# Patient Record
Sex: MALE | Race: WHITE | Employment: FULL TIME | URBAN - METROPOLITAN AREA
[De-identification: names, ages, dates, MRNs, and addresses within clinical notes are randomized per-mention and may not be internally consistent; named-entity substitution may affect disease eponyms.]

---

## 2019-08-28 ENCOUNTER — HOSPITAL ENCOUNTER (EMERGENCY)
Age: 54
Discharge: HOME | End: 2019-08-28
Attending: EMERGENCY MEDICINE
Payer: COMMERCIAL

## 2019-08-28 VITALS
TEMPERATURE: 98.6 F | RESPIRATION RATE: 16 BRPM | HEIGHT: 60 IN | BODY MASS INDEX: 51.04 KG/M2 | HEART RATE: 71 BPM | SYSTOLIC BLOOD PRESSURE: 103 MMHG | OXYGEN SATURATION: 98 % | WEIGHT: 260 LBS | DIASTOLIC BLOOD PRESSURE: 72 MMHG

## 2019-08-28 DIAGNOSIS — S82.032A CLOSED DISPLACED TRANSVERSE FRACTURE OF LEFT PATELLA, INITIAL ENCOUNTER: Primary | ICD-10-CM

## 2019-08-28 PROCEDURE — 99283 EMERGENCY DEPT VISIT LOW MDM: CPT

## 2019-08-28 PROCEDURE — 6360000002 HC RX W HCPCS: Performed by: STUDENT IN AN ORGANIZED HEALTH CARE EDUCATION/TRAINING PROGRAM

## 2019-08-28 PROCEDURE — 90471 IMMUNIZATION ADMIN: CPT | Performed by: STUDENT IN AN ORGANIZED HEALTH CARE EDUCATION/TRAINING PROGRAM

## 2019-08-28 PROCEDURE — 90715 TDAP VACCINE 7 YRS/> IM: CPT | Performed by: STUDENT IN AN ORGANIZED HEALTH CARE EDUCATION/TRAINING PROGRAM

## 2019-08-28 RX ORDER — ASPIRIN 81 MG/1
81 TABLET ORAL DAILY
Qty: 30 TABLET | Refills: 0 | Status: SHIPPED | OUTPATIENT
Start: 2019-08-28

## 2019-08-28 RX ORDER — IBUPROFEN 600 MG/1
600 TABLET ORAL EVERY 6 HOURS PRN
Qty: 120 TABLET | Refills: 0 | Status: SHIPPED | OUTPATIENT
Start: 2019-08-28

## 2019-08-28 RX ORDER — SERTRALINE HYDROCHLORIDE 100 MG/1
100 TABLET, FILM COATED ORAL DAILY
COMMUNITY

## 2019-08-28 RX ORDER — ACETAMINOPHEN 325 MG/1
325 TABLET ORAL EVERY 6 HOURS PRN
Qty: 120 TABLET | Refills: 3 | Status: SHIPPED | OUTPATIENT
Start: 2019-08-28

## 2019-08-28 RX ORDER — ATORVASTATIN CALCIUM 10 MG/1
10 TABLET, FILM COATED ORAL DAILY
COMMUNITY

## 2019-08-28 RX ORDER — OXYCODONE HYDROCHLORIDE 5 MG/1
5 TABLET ORAL EVERY 6 HOURS PRN
Qty: 10 TABLET | Refills: 0 | Status: SHIPPED | OUTPATIENT
Start: 2019-08-28 | End: 2019-08-31

## 2019-08-28 RX ADMIN — TETANUS TOXOID, REDUCED DIPHTHERIA TOXOID AND ACELLULAR PERTUSSIS VACCINE, ADSORBED 0.5 ML: 5; 2.5; 8; 8; 2.5 SUSPENSION INTRAMUSCULAR at 21:15

## 2019-08-28 ASSESSMENT — PAIN SCALES - GENERAL: PAINLEVEL_OUTOF10: 2

## 2019-08-28 ASSESSMENT — PAIN DESCRIPTION - ORIENTATION: ORIENTATION: LEFT

## 2019-08-28 ASSESSMENT — ENCOUNTER SYMPTOMS
EYE PAIN: 0
SORE THROAT: 0
CONSTIPATION: 0
COLOR CHANGE: 0
TROUBLE SWALLOWING: 0
WHEEZING: 0
VOMITING: 0
NAUSEA: 0
ABDOMINAL PAIN: 0
COUGH: 0
BACK PAIN: 0
SHORTNESS OF BREATH: 0
CHOKING: 0
ABDOMINAL DISTENTION: 0
RHINORRHEA: 0

## 2019-08-28 ASSESSMENT — PAIN DESCRIPTION - LOCATION: LOCATION: KNEE

## 2019-08-29 NOTE — ED NOTES
Abrasion noted below left patella, edema noted to left knee. Pt is unable to bed left knee.  Denies N/T to [address] [place]  08/28/19 2029

## 2019-08-29 NOTE — ED NOTES
Bed: 28  Expected date:   Expected time:   Means of arrival:   Comments:  [facility]     Rhea Tucker RN  08/28/19 2030

## 2019-08-29 NOTE — ED PROVIDER NOTES
[facility] ED  Emergency Department Encounter  EmergencyMedicine Resident     Pt Name:Solitario Bronson  MRN: [id number]  Armsmarygfnanette 1965  Date of evaluation: 8/28/19  PCP:  No primary care provider on file. CHIEF COMPLAINT       Chief Complaint   Patient presents with    Knee Pain       HISTORY OF PRESENT ILLNESS  (Location/Symptom, Timing/Onset, Context/Setting, Quality, Duration, Modifying Factors, Severity.)      Peggy Wharton is a 48 y.o. male who presents with left knee pain. Patient is is for from [facility] for close patellar fracture with distracted fragments. Patient presented to emergency department in [facility] after a fall from standing height when he tripped over a gas line. At [facility] he had x-rays, intramuscular Dilaudid he did not have tetanus updated. He does have an abrasion over the left knee has no numbness tingling or previous history of injury to that leg. Patient lives in [place] and was going to be put in a knee immobilizer to fly home for definitive treatment however he was transferred when there was concern for need from [facility] for corrective surgery prior to travel. He reports the pain is manageable at this moment. Denies striking head hurting arms wrists or any other extremity in the fall. PAST MEDICAL / SURGICAL / SOCIAL / FAMILY HISTORY      has a past medical history of Depression and Hyperlipidemia. has a past surgical history that includes Appendectomy.     Social History     Socioeconomic History    Marital status:      Spouse name: Not on file    Number of children: Not on file    Years of education: Not on file    Highest education level: Not on file   Occupational History    Not on file   Social Needs    Financial resource strain: Not on file    Food insecurity:     Worry: Not on file     Inability: Not on file    Transportation needs:     Medical: Not on file     Non-medical: Not on file

## 2019-08-29 NOTE — ED PROVIDER NOTES
[facility]     Emergency Department     Faculty Attestation    I performed a history and physical examination of the patient and discussed management with the resident. I reviewed the residents note and agree with the documented findings including all diagnostic interpretations and plan of care. Any areas of disagreement are noted on the chart. I was personally present for the key portions of any procedures. I have documented in the chart those procedures where I was not present during the key portions. I have reviewed the emergency nurses triage note. I agree with the chief complaint, past medical history, past surgical history, allergies, medications, social and family history as documented unless otherwise noted below. Documentation of the HPI, Physical Exam and Medical Decision Making performed by scribyessi is based on my personal performance of the HPI, PE and MDM. For Physician Assistant/ Nurse Practitioner cases/documentation I have personally evaluated this patient and have completed at least one if not all key elements of the E/M (history, physical exam, and MDM). Additional findings are as noted. Primary Care Physician: No primary care provider on file. History: This is a 48 y.o. male who presents to the Emergency Department with complaint of pain. Left-sided after trip and fall directly onto the knee. No other injuries. Transferred here from outside facility for orthopedic evaluation as he has a patellar fracture with significant distraction of the fragments. Physical:     height is 5' (1.524 m) and weight is 260 lb (117.9 kg). His oral temperature is 98.6 °F (37 °C). His blood pressure is 101/78 and his pulse is 60.  His respiration is 16 and oxygen saturation is 97%.    48 y.o. male no acute distress, cardiac exam regular rate and rhythm no murmurs rubs gallops, pulmonary clear bilaterally abdomen is soft nontender nondistended, left